# Patient Record
Sex: FEMALE | Race: WHITE | NOT HISPANIC OR LATINO | Employment: UNEMPLOYED | URBAN - METROPOLITAN AREA
[De-identification: names, ages, dates, MRNs, and addresses within clinical notes are randomized per-mention and may not be internally consistent; named-entity substitution may affect disease eponyms.]

---

## 2024-09-05 ENCOUNTER — APPOINTMENT (OUTPATIENT)
Dept: PRIMARY CARE | Facility: CLINIC | Age: 29
End: 2024-09-05
Payer: COMMERCIAL

## 2024-09-05 VITALS
WEIGHT: 133.8 LBS | HEART RATE: 79 BPM | OXYGEN SATURATION: 97 % | SYSTOLIC BLOOD PRESSURE: 98 MMHG | TEMPERATURE: 97.3 F | DIASTOLIC BLOOD PRESSURE: 65 MMHG | BODY MASS INDEX: 25.26 KG/M2 | HEIGHT: 61 IN

## 2024-09-05 DIAGNOSIS — R41.89 BRAIN FOG: ICD-10-CM

## 2024-09-05 DIAGNOSIS — J02.9 SORE THROAT: Primary | ICD-10-CM

## 2024-09-05 PROCEDURE — 3008F BODY MASS INDEX DOCD: CPT | Performed by: STUDENT IN AN ORGANIZED HEALTH CARE EDUCATION/TRAINING PROGRAM

## 2024-09-05 PROCEDURE — 1036F TOBACCO NON-USER: CPT | Performed by: STUDENT IN AN ORGANIZED HEALTH CARE EDUCATION/TRAINING PROGRAM

## 2024-09-05 PROCEDURE — 99203 OFFICE O/P NEW LOW 30 MIN: CPT | Performed by: STUDENT IN AN ORGANIZED HEALTH CARE EDUCATION/TRAINING PROGRAM

## 2024-09-05 RX ORDER — IBUPROFEN 800 MG/1
800 TABLET ORAL 3 TIMES DAILY PRN
Qty: 12 TABLET | Refills: 0 | Status: SHIPPED | OUTPATIENT
Start: 2024-09-05 | End: 2024-09-09

## 2024-09-05 RX ORDER — FERROUS SULFATE 325(65) MG
325 TABLET ORAL AS NEEDED
COMMUNITY
Start: 2022-11-02

## 2024-09-05 ASSESSMENT — ENCOUNTER SYMPTOMS
NERVOUS/ANXIOUS: 0
CHILLS: 0
FATIGUE: 1
FEVER: 0
COUGH: 1
SLEEP DISTURBANCE: 1
RHINORRHEA: 1

## 2024-09-05 ASSESSMENT — PATIENT HEALTH QUESTIONNAIRE - PHQ9
2. FEELING DOWN, DEPRESSED OR HOPELESS: NOT AT ALL
1. LITTLE INTEREST OR PLEASURE IN DOING THINGS: NOT AT ALL
SUM OF ALL RESPONSES TO PHQ9 QUESTIONS 1 & 2: 0

## 2024-09-05 NOTE — PROGRESS NOTES
Assessment/Plan   Problem List Items Addressed This Visit    None  Visit Diagnoses         Codes    Sore throat    -  Primary J02.9    resolving issue, ibuprofen as below to help resolve remaining inflammation     Relevant Medications    ibuprofen 800 mg tablet    Brain fog     R41.89    likely due to poor sleep but check below labs     Relevant Orders    Vitamin B12    CBC    Iron and TIBC    Basic metabolic panel    Vitamin D 25-Hydroxy,Total (for eval of Vitamin D levels)    TSH            Subjective   Patient ID: Monica EVANS is a 28 y.o. female who presents for New Patient Visit (Establish care/) and Sore Throat (Comes and go, tested neg for strep about 2 weeks ago at [facility], after about a week of sore throat./Ever since she got sick about a week before she went to [facility] clinic. Tested neg for covid this past weekend.).  HPI  Recurrent sore throat  -2 weeks ago went to [facility] for sore throat, myalgias, fatigue  and tested negative fot strep and COVID 19. She was given augmentin and felt better.   -Today her sore throat felt better but two days ago was very severe.   -She still has mild cough, mild sore throat rhinorrhea.   -She also had a small raised lesion that was hard and painful at the same time, denied purulence from the site. This lesion has resolved on its down.     Brain fog  -Started after she gave birth to her son 5 months ago.  -She forgets simple things and tends to repeat herself.  -Her son isn't sleeping well and she had gotten very little sleep.   Review of Systems   Constitutional:  Positive for fatigue. Negative for chills and fever.   HENT:  Positive for congestion and rhinorrhea.    Respiratory:  Positive for cough.    Psychiatric/Behavioral:  Positive for sleep disturbance. The patient is not nervous/anxious.        Objective   Physical Exam  Constitutional:       Appearance: Normal appearance.   HENT:      Head: Normocephalic and atraumatic.      Mouth/Throat:       Mouth: Mucous membranes are moist.      Pharynx: Oropharynx is clear. No oropharyngeal exudate or posterior oropharyngeal erythema.   Eyes:      Extraocular Movements: Extraocular movements intact.      Pupils: Pupils are equal, round, and reactive to light.   Cardiovascular:      Rate and Rhythm: Normal rate and regular rhythm.   Pulmonary:      Effort: Pulmonary effort is normal.      Breath sounds: Normal breath sounds.   Musculoskeletal:      Right lower leg: No edema.      Left lower leg: No edema.   Skin:     General: Skin is warm and dry.   Neurological:      Mental Status: She is alert.            Lucius Sutherland MD 09/05/24 8:44 AM